# Patient Record
Sex: FEMALE | URBAN - METROPOLITAN AREA
[De-identification: names, ages, dates, MRNs, and addresses within clinical notes are randomized per-mention and may not be internally consistent; named-entity substitution may affect disease eponyms.]

---

## 2021-03-06 ENCOUNTER — NURSE TRIAGE (OUTPATIENT)
Dept: NURSING | Facility: CLINIC | Age: 21
End: 2021-03-06

## 2021-03-06 NOTE — TELEPHONE ENCOUNTER
Patient just lost her virginity     Is bleeding a lot  -filled a super tampon in 5 min and then blood was trickling down leg  -filled super pad in 15   -has since been just sitting on the toilet and it just keeps gushing out    [place] at 11pm  Was under the influence of adderal and alcohol     States the intercourse was not rough and the male partner was not very large    No fever  Not dizzy/lightheaded  No abdominal/vaginal pain     Triaged to a disposition of Go to Ed. Patient is agreeable.     COVID 19 Nurse Triage Plan/Patient Instructions    Please be aware that novel coronavirus (COVID-19) may be circulating in the community. If you develop symptoms such as fever, cough, or SOB or if you have concerns about the presence of another infection including coronavirus (COVID-19), please contact your health care provider or visit [email or web address].     Disposition/Instructions    ED Visit recommended. Follow protocol based instructions.     Bring Your Own Device:  Please also bring your smart device(s) (smart phones, tablets, laptops) and their charging cables for your personal use and to communicate with your care team during your visit.    Thank you for taking steps to prevent the spread of this virus.  o Limit your contact with others.  o Wear a simple mask to cover your cough.  o Wash your hands well and often.    Resources    [facility]: About COVID-19: [email or web address]    CDC: What to Do If You're Sick: www.cdc.gov/coronavirus/2019-ncov/about/steps-when-sick.html    CDC: Ending Home Isolation: www.cdc.gov/coronavirus/2019-ncov/hcp/disposition-in-home-patients.html     CDC: Caring for Someone: www.cdc.gov/coronavirus/2019-ncov/if-you-are-sick/care-for-someone.html     [facility]: Interim Guidance for Hospital Discharge to Home: www.health.[facility].[place]/diseases/coronavirus/hcp/hospdischarge.pdf    [facility] clinical trials (COVID-19 research studies):  [email or web address]/[email or web address]-clinical-[email or web address]     Below are the COVID-19 hotlines at the [place] [facility] ([facility]). Interpreters are available.   o For health questions: Call 660-343-9509 or 1-110.478.3702 (7 a.m. to 7 p.m.)  o For questions about schools and childcare: Call 769-534-4585 or 1-627.245.3892 (7 a.m. to 7 p.m.)     Reason for Disposition    SEVERE vaginal bleeding (i.e., soaking 2 pads or tampons per hour and present 2 or more hours; 1 menstrual cup every 2 hours)    Additional Information    Negative: Shock suspected (e.g., cold/pale/clammy skin, too weak to stand, low BP, rapid pulse)    Negative: Difficult to awaken or acting confused (e.g., disoriented, slurred speech)    Negative: Passed out (i.e., lost consciousness, collapsed and was not responding)    Negative: Sounds like a life-threatening emergency to the triager    Negative: Followed a genital area injury    Negative: Pregnant > 20 weeks  (5 months or more)    Negative: Pregnant < 20 weeks  (less than 5 months)    Negative: Postpartum (from 0 to 6 weeks after delivery)    Negative: Bleeding occurring > 12 months after menopause    Negative: Bleeding from sexual abuse or rape    Negative: [1] Vaginal discharge is main symptom AND [2] small amount of blood    Negative: SEVERE abdominal pain    Negative: SEVERE dizziness (e.g., unable to stand, requires support to walk, feels like passing out now)    Protocols used: VAGINAL BLEEDING - [id number]    Arline Fierro RN on 3/6/2021 at 1:20 AM